# Patient Record
Sex: MALE | Race: WHITE | NOT HISPANIC OR LATINO
[De-identification: names, ages, dates, MRNs, and addresses within clinical notes are randomized per-mention and may not be internally consistent; named-entity substitution may affect disease eponyms.]

---

## 2019-05-15 PROBLEM — Z00.00 ENCOUNTER FOR PREVENTIVE HEALTH EXAMINATION: Status: ACTIVE | Noted: 2019-05-15

## 2019-06-03 ENCOUNTER — APPOINTMENT (OUTPATIENT)
Dept: UROLOGY | Facility: CLINIC | Age: 69
End: 2019-06-03
Payer: MEDICARE

## 2019-06-03 VITALS
HEIGHT: 67 IN | BODY MASS INDEX: 33.74 KG/M2 | SYSTOLIC BLOOD PRESSURE: 122 MMHG | HEART RATE: 62 BPM | DIASTOLIC BLOOD PRESSURE: 73 MMHG | WEIGHT: 215 LBS

## 2019-06-03 DIAGNOSIS — E11.9 TYPE 2 DIABETES MELLITUS W/OUT COMPLICATIONS: ICD-10-CM

## 2019-06-03 DIAGNOSIS — Z78.9 OTHER SPECIFIED HEALTH STATUS: ICD-10-CM

## 2019-06-03 DIAGNOSIS — E78.00 PURE HYPERCHOLESTEROLEMIA, UNSPECIFIED: ICD-10-CM

## 2019-06-03 DIAGNOSIS — I10 ESSENTIAL (PRIMARY) HYPERTENSION: ICD-10-CM

## 2019-06-03 DIAGNOSIS — Z79.4 TYPE 2 DIABETES MELLITUS W/OUT COMPLICATIONS: ICD-10-CM

## 2019-06-03 DIAGNOSIS — Z82.0 FAMILY HISTORY OF EPILEPSY AND OTHER DISEASES OF THE NERVOUS SYSTEM: ICD-10-CM

## 2019-06-03 DIAGNOSIS — K21.9 GASTRO-ESOPHAGEAL REFLUX DISEASE W/OUT ESOPHAGITIS: ICD-10-CM

## 2019-06-03 DIAGNOSIS — Z83.3 FAMILY HISTORY OF DIABETES MELLITUS: ICD-10-CM

## 2019-06-03 PROCEDURE — 99203 OFFICE O/P NEW LOW 30 MIN: CPT

## 2019-06-03 RX ORDER — ROSUVASTATIN CALCIUM 10 MG/1
10 TABLET, FILM COATED ORAL
Refills: 0 | Status: ACTIVE | COMMUNITY

## 2019-06-03 RX ORDER — HYDROCHLOROTHIAZIDE 12.5 MG/1
TABLET ORAL
Refills: 0 | Status: ACTIVE | COMMUNITY

## 2019-06-03 RX ORDER — OMEPRAZOLE 20 MG/1
TABLET, DELAYED RELEASE ORAL
Refills: 0 | Status: ACTIVE | COMMUNITY

## 2019-06-03 NOTE — REVIEW OF SYSTEMS
[Feeling Tired] : feeling tired [see HPI] : see HPI [Erectile Dysfunction] : erectile dysfunction [Negative] : Heme/Lymph [FreeTextEntry1] : low libido

## 2019-06-03 NOTE — ASSESSMENT
[FreeTextEntry1] : His physical exam other than the obesity shows significant anxiety. He keeps perseverating over his fertility uses it happen over 40 years ago and how he thinks it might be related to his erectile dysfunction though between the damage done during the fertility surgery and up till five years ago he had no trouble with erections. From the physical side the penis has atrophy, I believe is a small right hydrocele, as a evidence of a hernia, his prostate is almost flat with good BC reflex and good rectal tone. He has mild edema the lower extremities with normal peripheral reflexes.\par \par I’m going to need instant criteria classification. They understand his PCP gave him some sildenafil however I don’t know exactly when and especially since his cardiologist said there was some changes though he doesn’t know exactly what they were that needs clarification. Cause of his obesity and sending him for hormone studies and will see what they show. If his [place] criteria is equal to “I” and his hormones are normal or if his erectile dysfunction persists after the hormones are corrected I would then have him try sildenafil at the hundred milligram dose. If that works all well and good if not then we can consider injections. Please note he’s been taking some “vitamins” from the [employer] and I suggested he stop that as even the ones made in [place] are not necessarily regulated. The ones that are made out of the country I definitely not regulated and sometimes have serious side effects as they have products in them that are not just fine.\par

## 2019-06-03 NOTE — PHYSICAL EXAM
[General Appearance - Well Nourished] : well nourished [General Appearance - Well Developed] : well developed [Well Groomed] : well groomed [Normal Appearance] : normal appearance [General Appearance - In No Acute Distress] : no acute distress [Heart Rate And Rhythm] : Heart rate and rhythm were normal [Auscultation Breath Sounds / Voice Sounds] : lungs were clear to auscultation bilaterally [Respiration, Rhythm And Depth] : normal respiratory rhythm and effort [Exaggerated Use Of Accessory Muscles For Inspiration] : no accessory muscle use [Abdomen Soft] : soft [Abdomen Tenderness] : non-tender [Abdomen Hernia] : no hernia was discovered [Costovertebral Angle Tenderness] : no ~M costovertebral angle tenderness [Testes Tenderness] : no tenderness of the testes [Epididymis] : the epididymides were normal [Urethral Meatus] : meatus normal [Penis Abnormality] : normal circumcised penis [Prostate Enlargement] : the prostate was not enlarged [Testes Mass (___cm)] : there were no testicular masses [Rectal Exam - Rectum] : digital rectal exam was normal [Prostate Size ___ gm] : prostate size [unfilled] gm [No Prostate Nodules] : no prostate nodules [Prostate Tenderness] : the prostate was not tender [Normal Station and Gait] : the gait and station were normal for the patient's age [FreeTextEntry1] : DTR's & BC reflexes were intact  [No Focal Deficits] : no focal deficits [] : no rash [Affect] : the affect was normal [Oriented To Time, Place, And Person] : oriented to person, place, and time [Not Anxious] : not anxious [Inguinal Lymph Nodes Enlarged Bilaterally] : inguinal [Mood] : the mood was normal

## 2019-06-03 NOTE — LETTER HEADER
[FreeTextEntry3] : Kacie Mora M.D.\par Director of Urology\par [facility]\par [address] [place] [address], [address]\[place] [address]

## 2019-06-03 NOTE — LETTER BODY
[Dear  ___] : Dear  [unfilled], [Please see my note below.] : Please see my note below. [Consult Letter:] : I had the pleasure of evaluating your patient, [unfilled]. [Sincerely,] : Sincerely, [Consult Closing:] : Thank you very much for allowing me to participate in the care of this patient.  If you have any questions, please do not hesitate to contact me. [DrPeter  ___] : Dr. GOMEZ [FreeTextEntry2] : Raj Chaney MD\par [address] [place] [address]\[place] [address]\par

## 2019-08-12 ENCOUNTER — APPOINTMENT (OUTPATIENT)
Dept: UROLOGY | Facility: CLINIC | Age: 69
End: 2019-08-12
Payer: MEDICARE

## 2019-08-12 VITALS
BODY MASS INDEX: 33.74 KG/M2 | HEIGHT: 67 IN | HEART RATE: 60 BPM | WEIGHT: 215 LBS | SYSTOLIC BLOOD PRESSURE: 133 MMHG | DIASTOLIC BLOOD PRESSURE: 67 MMHG

## 2019-08-12 PROCEDURE — 99214 OFFICE O/P EST MOD 30 MIN: CPT

## 2019-08-12 NOTE — LETTER BODY
[Dear  ___] : Dear  [unfilled], [Consult Letter:] : I had the pleasure of evaluating your patient, [unfilled]. [Please see my note below.] : Please see my note below. [Consult Closing:] : Thank you very much for allowing me to participate in the care of this patient.  If you have any questions, please do not hesitate to contact me. [Sincerely,] : Sincerely, [DrPetre  ___] : Dr. GOMEZ [FreeTextEntry2] : Raj Chaney MD\par [address] [place] [address]\[place] [address]\par

## 2019-08-12 NOTE — HISTORY OF PRESENT ILLNESS
[Currently Experiencing ___] :  [unfilled] [Nocturia] : nocturia [Erectile Dysfunction] : Erectile Dysfunction [FreeTextEntry1] : Mr. Glass was seen on Destini 3. He has a complex cardiac and endocrinologic history, he was seen for erectile dysfunction with the exam leading to a question of low testosterone.\par \par He was sent for blood tests which were done on June 11, 2019. In addition he switched to Dr. Gary who was had a separate set of labs done on July 1. He’s here for review and a discussion. Please note he was asked to get [place] criteria classification. He thinks he gave it to one of two doctors he’s not sure but neither one has sent us the filled out report something that he needs to take care of\par

## 2019-08-12 NOTE — PHYSICAL EXAM
[General Appearance - Well Developed] : well developed [Normal Appearance] : normal appearance [General Appearance - Well Nourished] : well nourished [Well Groomed] : well groomed [General Appearance - In No Acute Distress] : no acute distress [] : no respiratory distress [Exaggerated Use Of Accessory Muscles For Inspiration] : no accessory muscle use [Respiration, Rhythm And Depth] : normal respiratory rhythm and effort [Oriented To Time, Place, And Person] : oriented to person, place, and time [Mood] : the mood was normal [Affect] : the affect was normal [Not Anxious] : not anxious [Normal Station and Gait] : the gait and station were normal for the patient's age [FreeTextEntry1] : moderate obesity

## 2019-08-12 NOTE — ASSESSMENT
[FreeTextEntry1] : We have low normal total, low normal to low free and low bioavailable testosterone I don’t have his [place] criteria classification but it’s not going to make a difference at this point as I would want to treat him and get his hormone levels normal and see that alone helps his erections. Of the time he comes back he will need the [place] criteria classification filled out and sent to me.

## 2019-08-12 NOTE — LETTER HEADER
[FreeTextEntry3] : Kacie Mora M.D.\par Director of Urology\par [facility]\par [address] [place] [address], [address]\[place] [address]

## 2019-09-12 ENCOUNTER — APPOINTMENT (OUTPATIENT)
Dept: UROLOGY | Facility: CLINIC | Age: 69
End: 2019-09-12
Payer: MEDICARE

## 2019-09-12 VITALS
HEIGHT: 67 IN | HEART RATE: 65 BPM | SYSTOLIC BLOOD PRESSURE: 113 MMHG | DIASTOLIC BLOOD PRESSURE: 69 MMHG | WEIGHT: 215 LBS | BODY MASS INDEX: 33.74 KG/M2

## 2019-09-12 PROCEDURE — 99213 OFFICE O/P EST LOW 20 MIN: CPT

## 2019-09-12 RX ORDER — METFORMIN HYDROCHLORIDE 500 MG/1
500 TABLET, COATED ORAL
Qty: 180 | Refills: 0 | Status: ACTIVE | COMMUNITY
Start: 2019-02-18

## 2019-09-12 RX ORDER — LOSARTAN POTASSIUM 100 MG/1
100 TABLET, FILM COATED ORAL
Qty: 90 | Refills: 0 | Status: ACTIVE | COMMUNITY
Start: 2019-02-18

## 2019-09-12 NOTE — ADDENDUM
[FreeTextEntry1] : Clearance received dated August 10, 2019 once he lets us know which pharmacy he wishes we will send the script

## 2019-09-12 NOTE — LETTER HEADER
[FreeTextEntry3] : Kacie Mora M.D.\par Director of Urology\par [facility]\par [address] [place] [address], [address]\[place] [address]

## 2019-09-12 NOTE — HISTORY OF PRESENT ILLNESS
[Currently Experiencing ___] :  [unfilled] [Nocturia] : nocturia [Erectile Dysfunction] : Erectile Dysfunction [FreeTextEntry1] : Cecilio is a 69-year-old male, with a complex cardiac and endocrinologic history, who we have been following for erectile dysfunction and decreased testosterone.\par His hormonal panel showed persistent low testosterone and he was started on AndroGel pump 2 pumps per day. He denies spontaneous return of his erections on testosterone and presents today to review his blood work on AndroGel.\par \par He states that he has obtained Jacinto Franco criteria from Dr. CONSTANTINO Chaney however, he did not bring it with him for review

## 2019-09-12 NOTE — PHYSICAL EXAM
[General Appearance - Well Nourished] : well nourished [General Appearance - Well Developed] : well developed [Normal Appearance] : normal appearance [Well Groomed] : well groomed [General Appearance - In No Acute Distress] : no acute distress [] : no respiratory distress [Respiration, Rhythm And Depth] : normal respiratory rhythm and effort [Exaggerated Use Of Accessory Muscles For Inspiration] : no accessory muscle use [Oriented To Time, Place, And Person] : oriented to person, place, and time [Affect] : the affect was normal [Mood] : the mood was normal [Not Anxious] : not anxious [Normal Station and Gait] : the gait and station were normal for the patient's age [No Focal Deficits] : no focal deficits [FreeTextEntry1] : morbid obesity

## 2019-09-12 NOTE — ASSESSMENT
[FreeTextEntry1] : His blood work shows improving but still low testosterone levels. He will double up on AndroGel from 2-4 pumps per day.\par \par He will e-mail me his [place] criteria and we will send sildenafil for him at time. We discussed all usage, dosage, mechanism of action, adverse events of sildenafil. He understands not to take more than 100 mg in a 24-hour period.\par \par He will follow up with blood work in 4 weeks.\par

## 2019-09-13 RX ORDER — SILDENAFIL 20 MG/1
20 TABLET ORAL
Qty: 30 | Refills: 3 | Status: ACTIVE | COMMUNITY
Start: 2019-09-13 | End: 1900-01-01

## 2019-10-03 ENCOUNTER — OUTPATIENT (OUTPATIENT)
Dept: OUTPATIENT SERVICES | Facility: HOSPITAL | Age: 69
LOS: 1 days | Discharge: HOME | End: 2019-10-03

## 2019-10-03 DIAGNOSIS — R79.89 OTHER SPECIFIED ABNORMAL FINDINGS OF BLOOD CHEMISTRY: ICD-10-CM

## 2019-10-04 LAB
ALBUMIN SERPL ELPH-MCNC: 4.2 G/DL
ALP BLD-CCNC: 70 U/L
ALT SERPL-CCNC: 12 U/L
AST SERPL-CCNC: 14 U/L
BASOPHILS # BLD AUTO: 0.05 K/UL
BASOPHILS NFR BLD AUTO: 0.6 %
BILIRUB DIRECT SERPL-MCNC: 0.2 MG/DL
BILIRUB INDIRECT SERPL-MCNC: 0.9 MG/DL
BILIRUB SERPL-MCNC: 1.1 MG/DL
EOSINOPHIL # BLD AUTO: 0.51 K/UL
EOSINOPHIL NFR BLD AUTO: 6.3 %
ESTRADIOL SERPL-MCNC: 28 PG/ML
HCT VFR BLD CALC: 40.3 %
HGB BLD-MCNC: 12.7 G/DL
IMM GRANULOCYTES NFR BLD AUTO: 0.2 %
LYMPHOCYTES # BLD AUTO: 2.15 K/UL
LYMPHOCYTES NFR BLD AUTO: 26.7 %
MAN DIFF?: NORMAL
MCHC RBC-ENTMCNC: 27.5 PG
MCHC RBC-ENTMCNC: 31.5 G/DL
MCV RBC AUTO: 87.2 FL
MONOCYTES # BLD AUTO: 0.57 K/UL
MONOCYTES NFR BLD AUTO: 7.1 %
NEUTROPHILS # BLD AUTO: 4.74 K/UL
NEUTROPHILS NFR BLD AUTO: 59.1 %
PLATELET # BLD AUTO: 250 K/UL
PROT SERPL-MCNC: 6.7 G/DL
RBC # BLD: 4.62 M/UL
RBC # FLD: 13.1 %
WBC # FLD AUTO: 8.04 K/UL

## 2019-10-07 LAB
TESTOST BND SERPL-MCNC: 9.6 PG/ML
TESTOST SERPL-MCNC: 494.1 NG/DL

## 2019-10-09 LAB
SHBG SERPL-SCNC: 42 NMOL/L
TESTOST BND SERPL-MCNC: 13.7 NG/DL
TESTOSTERONE BIOAVAILABLE: 106 NG/DL
TESTOSTERONE TOTAL S: 506 NG/DL

## 2019-10-10 ENCOUNTER — APPOINTMENT (OUTPATIENT)
Dept: UROLOGY | Facility: CLINIC | Age: 69
End: 2019-10-10
Payer: MEDICARE

## 2019-10-10 VITALS
SYSTOLIC BLOOD PRESSURE: 132 MMHG | DIASTOLIC BLOOD PRESSURE: 73 MMHG | WEIGHT: 215 LBS | BODY MASS INDEX: 33.74 KG/M2 | HEART RATE: 62 BPM | HEIGHT: 67 IN

## 2019-10-10 DIAGNOSIS — E66.8 OTHER OBESITY: ICD-10-CM

## 2019-10-10 DIAGNOSIS — R35.1 NOCTURIA: ICD-10-CM

## 2019-10-10 PROCEDURE — 99214 OFFICE O/P EST MOD 30 MIN: CPT

## 2019-10-10 NOTE — LETTER BODY
[Dear  ___] : Dear  [unfilled], [Consult Closing:] : Thank you very much for allowing me to participate in the care of this patient.  If you have any questions, please do not hesitate to contact me. [Please see my note below.] : Please see my note below. [Courtesy Letter:] : I had the pleasure of seeing your patient, [unfilled], in my office today. [Sincerely,] : Sincerely, [FreeTextEntry2] : Raj Chaney MD\par [address] [place] [address]\[place] [address]\par  [DrPeter  ___] : Dr. GOMEZ

## 2019-10-10 NOTE — ASSESSMENT
[FreeTextEntry1] : Physical exam shows no signs of fluid retention.\par \par He is not yet tried the sildenafil is a of an upcoming wedding with a lot of stress and plain and simple his wife is not interested at this point in time. His hope that once the days after the wedding are finished and his daughter and future son-in-law go to [facility] for him to study things will come down he’ll be able to try. For now I don’t like that he is a drop over as he is not the best cardiac patient with numerous other medical problems some and have him drop every third day to three pumps i.e. two out of three days will take four pumps 13 days will take three pumps and he will alternate. If he forgets where he was up to will just go back to four pumps per day and then drop the third day he remembers will do it as scheduled. Everything should be calmer. Will therefore get blood drawn in four weeks and see me in five\par

## 2019-10-10 NOTE — LETTER HEADER
[FreeTextEntry3] : Kacie Mora M.D.\par Director of Urology\par [facility]\par [address] [place] [address], [address]\[place] [address]

## 2019-10-10 NOTE — HISTORY OF PRESENT ILLNESS
[FreeTextEntry1] : Cecilio and was last seen on September 12. After a give-and-take he finally got sildenafil 20 mg tablets but has not tried any of them with his wife. He is on AndroGel equivalent, 1.62% now up to four pumps per day. He had blood drawn on October 3 and is here for review. [Nocturia] : nocturia [Currently Experiencing ___] :  [unfilled] [Erectile Dysfunction] : Erectile Dysfunction

## 2019-10-11 LAB
% FREE TESTOSTERONE - ESO: 1.8 %
FREE TESTOSTERONE - ESO: 95 PG/ML
SHBG-ESOTERIX: 55.2 NMOL/L
TESTOSTERONE SERUM - ESO: 525 NG/DL

## 2019-11-07 ENCOUNTER — OUTPATIENT (OUTPATIENT)
Dept: OUTPATIENT SERVICES | Facility: HOSPITAL | Age: 69
LOS: 1 days | Discharge: HOME | End: 2019-11-07

## 2019-11-07 DIAGNOSIS — R79.89 OTHER SPECIFIED ABNORMAL FINDINGS OF BLOOD CHEMISTRY: ICD-10-CM

## 2019-11-07 LAB — ESTRADIOL SERPL-MCNC: 29 PG/ML

## 2019-11-09 LAB — SHBG SERPL-SCNC: 38 NMOL/L

## 2019-11-13 LAB
% FREE TESTOSTERONE - ESO: 1.8 %
FREE TESTOSTERONE - ESO: 118 PG/ML
SHBG-ESOTERIX: 43.2 NMOL/L
TESTOSTERONE SERUM - ESO: 653 NG/DL

## 2019-11-14 ENCOUNTER — APPOINTMENT (OUTPATIENT)
Dept: UROLOGY | Facility: CLINIC | Age: 69
End: 2019-11-14
Payer: MEDICARE

## 2019-11-14 VITALS
DIASTOLIC BLOOD PRESSURE: 59 MMHG | HEART RATE: 56 BPM | WEIGHT: 215 LBS | SYSTOLIC BLOOD PRESSURE: 118 MMHG | HEIGHT: 67 IN | BODY MASS INDEX: 33.74 KG/M2

## 2019-11-14 DIAGNOSIS — R68.82 DECREASED LIBIDO: ICD-10-CM

## 2019-11-14 PROCEDURE — 99213 OFFICE O/P EST LOW 20 MIN: CPT

## 2019-11-14 NOTE — ASSESSMENT
[FreeTextEntry1] : His hormones are now within normal physiologic ranges physical examination unremarkable. We discussed alternatives such as Testopel, Xyosted, and IM injections. At this time he has elected to continue with gel. He'll get blood work in 3 months & follow up after for review. We will again discuss other options when he comes back then. He is aware of the risks of transference and how to be careful.

## 2019-11-14 NOTE — LETTER BODY
[Dear  ___] : Dear  [unfilled], [Courtesy Letter:] : I had the pleasure of seeing your patient, [unfilled], in my office today. [Please see my note below.] : Please see my note below. [Consult Closing:] : Thank you very much for allowing me to participate in the care of this patient.  If you have any questions, please do not hesitate to contact me. [Sincerely,] : Sincerely, [DrPeter  ___] : Dr. GOMEZ [FreeTextEntry2] : Raj Chaney MD\par [address] [place] [address]\[place] [address]\par

## 2019-11-14 NOTE — PHYSICAL EXAM
[General Appearance - Well Developed] : well developed [General Appearance - Well Nourished] : well nourished [Normal Appearance] : normal appearance [Well Groomed] : well groomed [General Appearance - In No Acute Distress] : no acute distress [Abdomen Soft] : soft [Abdomen Tenderness] : non-tender [Costovertebral Angle Tenderness] : no ~M costovertebral angle tenderness [Edema] : no peripheral edema [Respiration, Rhythm And Depth] : normal respiratory rhythm and effort [] : no respiratory distress [Oriented To Time, Place, And Person] : oriented to person, place, and time [Exaggerated Use Of Accessory Muscles For Inspiration] : no accessory muscle use [Not Anxious] : not anxious [Mood] : the mood was normal [Affect] : the affect was normal [No Focal Deficits] : no focal deficits [Normal Station and Gait] : the gait and station were normal for the patient's age [FreeTextEntry1] : Obese

## 2019-11-14 NOTE — LETTER HEADER
[FreeTextEntry3] : Kacie Mora M.D.\par Director of Urology\par [facility]\par [address] [place] [address], [address]\[place] [address]

## 2019-11-14 NOTE — HISTORY OF PRESENT ILLNESS
[Currently Experiencing ___] :  [unfilled] [Erectile Dysfunction] : Erectile Dysfunction [Nocturia] : nocturia [FreeTextEntry1] : Cecilio is a 69 year old male who we have been following for low testosterone and ED.\par \par He is on AndroGel 1.62% now alternating between 2 and 4 pumps per day. He presents for blood work and review.\par \par He was give sildenafil however, he did not yet try it.\par

## 2020-02-11 LAB
ALBUMIN SERPL ELPH-MCNC: 4.3 G/DL
ALP BLD-CCNC: 60 U/L
ALT SERPL-CCNC: 13 U/L
AST SERPL-CCNC: 16 U/L
BASOPHILS # BLD AUTO: 0.05 K/UL
BASOPHILS NFR BLD AUTO: 0.6 %
BILIRUB DIRECT SERPL-MCNC: <0.2 MG/DL
BILIRUB INDIRECT SERPL-MCNC: >0.4 MG/DL
BILIRUB SERPL-MCNC: 0.6 MG/DL
EOSINOPHIL # BLD AUTO: 0.62 K/UL
EOSINOPHIL NFR BLD AUTO: 7.5 %
HCT VFR BLD CALC: 39.9 %
HGB BLD-MCNC: 12.6 G/DL
IMM GRANULOCYTES NFR BLD AUTO: 0.2 %
LYMPHOCYTES # BLD AUTO: 2.38 K/UL
LYMPHOCYTES NFR BLD AUTO: 28.6 %
MAN DIFF?: NORMAL
MCHC RBC-ENTMCNC: 26.8 PG
MCHC RBC-ENTMCNC: 31.6 G/DL
MCV RBC AUTO: 84.9 FL
MONOCYTES # BLD AUTO: 0.74 K/UL
MONOCYTES NFR BLD AUTO: 8.9 %
NEUTROPHILS # BLD AUTO: 4.5 K/UL
NEUTROPHILS NFR BLD AUTO: 54.2 %
PLATELET # BLD AUTO: 234 K/UL
PROT SERPL-MCNC: 6.7 G/DL
RBC # BLD: 4.7 M/UL
RBC # FLD: 14.4 %
WBC # FLD AUTO: 8.31 K/UL

## 2020-02-12 LAB — ESTRADIOL SERPL-MCNC: 27 PG/ML

## 2020-02-14 LAB
SHBG SERPL-SCNC: 46 NMOL/L
TESTOST BND SERPL-MCNC: 9.98 NG/DL
TESTOSTERONE BIOAVAILABLE: 82 NG/DL
TESTOSTERONE TOTAL S: 434 NG/DL

## 2020-02-18 LAB
% FREE TESTOSTERONE - ESO: 1.4 %
FREE TESTOSTERONE - ESO: 59 PG/ML
SHBG-ESOTERIX: 48.7 NMOL/L
TESTOST BND SERPL-MCNC: 7.1 PG/ML
TESTOST SERPL-MCNC: 406.9 NG/DL
TESTOSTERONE SERUM - ESO: 422 NG/DL

## 2020-02-19 ENCOUNTER — APPOINTMENT (OUTPATIENT)
Dept: UROLOGY | Facility: CLINIC | Age: 70
End: 2020-02-19
Payer: MEDICARE

## 2020-02-19 VITALS
SYSTOLIC BLOOD PRESSURE: 115 MMHG | WEIGHT: 210 LBS | HEIGHT: 67 IN | BODY MASS INDEX: 32.96 KG/M2 | HEART RATE: 69 BPM | DIASTOLIC BLOOD PRESSURE: 62 MMHG

## 2020-02-19 DIAGNOSIS — N52.9 MALE ERECTILE DYSFUNCTION, UNSPECIFIED: ICD-10-CM

## 2020-02-19 DIAGNOSIS — R79.89 OTHER SPECIFIED ABNORMAL FINDINGS OF BLOOD CHEMISTRY: ICD-10-CM

## 2020-02-19 PROCEDURE — 99213 OFFICE O/P EST LOW 20 MIN: CPT

## 2020-02-19 RX ORDER — METFORMIN HYDROCHLORIDE 1000 MG/1
1000 TABLET, COATED ORAL
Refills: 0 | Status: COMPLETED | COMMUNITY
End: 2020-02-19

## 2020-02-19 RX ORDER — TRAMADOL HYDROCHLORIDE 50 MG/1
50 TABLET, COATED ORAL
Qty: 30 | Refills: 0 | Status: COMPLETED | COMMUNITY
Start: 2019-12-24

## 2020-02-19 RX ORDER — LOSARTAN POTASSIUM AND HYDROCHLOROTHIAZIDE 12.5; 1 MG/1; MG/1
100-12.5 TABLET ORAL
Refills: 0 | Status: COMPLETED | COMMUNITY
End: 2020-02-19

## 2020-02-19 RX ORDER — AZITHROMYCIN 250 MG/1
250 TABLET, FILM COATED ORAL
Qty: 6 | Refills: 0 | Status: COMPLETED | COMMUNITY
Start: 2019-11-04

## 2020-02-19 RX ORDER — OLOPATADINE HCL 1 MG/ML
0.1 SOLUTION/ DROPS OPHTHALMIC
Qty: 5 | Refills: 0 | Status: COMPLETED | COMMUNITY
Start: 2019-04-30 | End: 2020-02-19

## 2020-02-19 RX ORDER — TESTOSTERONE 16.2 MG/G
20.25 MG/ACT GEL TRANSDERMAL
Qty: 6 | Refills: 0 | Status: ACTIVE | COMMUNITY
Start: 2019-08-12 | End: 1900-01-01

## 2020-02-19 RX ORDER — HYDROCORTISONE 2.5% 25 MG/G
2.5 CREAM TOPICAL
Qty: 60 | Refills: 0 | Status: COMPLETED | COMMUNITY
Start: 2019-09-23

## 2020-02-19 NOTE — LETTER BODY
[Dear  ___] : Dear  [unfilled], [Courtesy Letter:] : I had the pleasure of seeing your patient, [unfilled], in my office today. [Please see my note below.] : Please see my note below. [Sincerely,] : Sincerely, [FreeTextEntry2] : Raj Chaney MD\par [address] [place] [address]\[place] [address]\par  [DrPeter  ___] : Dr. GOMEZ

## 2020-02-19 NOTE — PHYSICAL EXAM
[General Appearance - Well Nourished] : well nourished [General Appearance - Well Developed] : well developed [General Appearance - In No Acute Distress] : no acute distress [Well Groomed] : well groomed [Normal Appearance] : normal appearance [Abdomen Soft] : soft [FreeTextEntry1] : moderate obesity [Costovertebral Angle Tenderness] : no ~M costovertebral angle tenderness [Abdomen Tenderness] : non-tender [] : no respiratory distress [Exaggerated Use Of Accessory Muscles For Inspiration] : no accessory muscle use [Respiration, Rhythm And Depth] : normal respiratory rhythm and effort [Auscultation Breath Sounds / Voice Sounds] : lungs were clear to auscultation bilaterally [Affect] : the affect was normal [Oriented To Time, Place, And Person] : oriented to person, place, and time [Not Anxious] : not anxious [Normal Station and Gait] : the gait and station were normal for the patient's age [Mood] : the mood was normal

## 2020-02-19 NOTE — HISTORY OF PRESENT ILLNESS
[FreeTextEntry1] : Mr. Glass was last seen November 14. We have him on testosterone 1.62% AndroGel pump using four pumps per day and sildenafil 100 mg. He tells me with this combination he is able to get an erection he is able to be sufficiently rigid for penetration however his wife is complaining as she has sciatica and some vaginal tightness. He is also complaining that the gel is irritating his skin he has a history of eczema he wants know if there are other options. [Currently Experiencing ___] :  [unfilled] [Erectile Dysfunction] : Erectile Dysfunction [Nocturia] : nocturia

## 2020-02-19 NOTE — LETTER HEADER
[FreeTextEntry3] : Kacie Mora M.D.\par Director of Urology\par [facility]\par [address] [place] [address], [address]\[place] [address]

## 2020-02-19 NOTE — ASSESSMENT
[FreeTextEntry1] : Physical exam shows no signs of fluid retention, his mild pedal edema but that is knockout worse.\par \par please note his hemoglobin back in October was also anemic at 12.7 and he tells me he’s had a recent endoscopy because of this which was normal and he’s been put on I pills.\par \par His numbers are okay, his responses okay his wife’s issues are not something I can deal with something he will have to work out with her and or her with her doctor. As far as alternatives, there are other brands of his insurance would cover it for example Testim or is generic form gets put on the thigh, Axiron or is generic form gets put in the underarm. We also discussed Xyosted which are not sure if they would cover, intramuscular testosterone is an oil-based and is painful, and we can always try Testopel which is appellate put in the fat of the buttocks but at the end after discussion his decision was the evil he knows is better than the evil he doesn’t and will stay with the AndroGel.\par

## 2020-05-18 ENCOUNTER — APPOINTMENT (OUTPATIENT)
Dept: UROLOGY | Facility: CLINIC | Age: 70
End: 2020-05-18

## 2023-04-04 NOTE — LETTER BODY
Please see consult note dated 04/03/2023 for detailed H&P.    Sherice Alanis MD  General Surgery, PGY-4  [phone]    [Dear  ___] : Dear  [unfilled], [Courtesy Letter:] : I had the pleasure of seeing your patient, [unfilled], in my office today. [Please see my note below.] : Please see my note below. [Consult Closing:] : Thank you very much for allowing me to participate in the care of this patient.  If you have any questions, please do not hesitate to contact me. [Sincerely,] : Sincerely, [DrPeter  ___] : Dr. GOMEZ [FreeTextEntry2] : Raj Chaney MD\par [address] [place] [address]\[place] [address]\par